# Patient Record
Sex: MALE | Race: WHITE
[De-identification: names, ages, dates, MRNs, and addresses within clinical notes are randomized per-mention and may not be internally consistent; named-entity substitution may affect disease eponyms.]

---

## 2018-11-21 ENCOUNTER — HOSPITAL ENCOUNTER (EMERGENCY)
Dept: HOSPITAL 74 - JER | Age: 68
Discharge: HOME | End: 2018-11-21
Payer: COMMERCIAL

## 2018-11-21 VITALS — SYSTOLIC BLOOD PRESSURE: 135 MMHG | TEMPERATURE: 98.2 F | DIASTOLIC BLOOD PRESSURE: 84 MMHG | HEART RATE: 60 BPM

## 2018-11-21 VITALS — BODY MASS INDEX: 26.6 KG/M2

## 2018-11-21 DIAGNOSIS — Z87.442: ICD-10-CM

## 2018-11-21 DIAGNOSIS — N13.2: Primary | ICD-10-CM

## 2018-11-21 LAB
ALBUMIN SERPL-MCNC: 4 G/DL (ref 3.4–5)
ALP SERPL-CCNC: 92 U/L (ref 45–117)
ALT SERPL-CCNC: 23 U/L (ref 13–61)
ANION GAP SERPL CALC-SCNC: 5 MMOL/L (ref 8–16)
APPEARANCE UR: CLEAR
APTT BLD: 23.1 SECONDS (ref 25.2–36.5)
AST SERPL-CCNC: 16 U/L (ref 15–37)
BASOPHILS # BLD: 0.7 % (ref 0–2)
BILIRUB SERPL-MCNC: 0.4 MG/DL (ref 0.2–1)
BILIRUB UR STRIP.AUTO-MCNC: NEGATIVE MG/DL
BUN SERPL-MCNC: 20 MG/DL (ref 7–18)
CALCIUM SERPL-MCNC: 9.6 MG/DL (ref 8.5–10.1)
CHLORIDE SERPL-SCNC: 110 MMOL/L (ref 98–107)
CO2 SERPL-SCNC: 25 MMOL/L (ref 21–32)
COLOR UR: (no result)
CREAT SERPL-MCNC: 1.2 MG/DL (ref 0.55–1.3)
DEPRECATED RDW RBC AUTO: 13.6 % (ref 11.9–15.9)
EOSINOPHIL # BLD: 0.4 % (ref 0–4.5)
GLUCOSE SERPL-MCNC: 114 MG/DL (ref 74–106)
HCT VFR BLD CALC: 45.3 % (ref 35.4–49)
HGB BLD-MCNC: 14.4 GM/DL (ref 11.7–16.9)
INR BLD: 0.99 (ref 0.83–1.09)
KETONES UR QL STRIP: NEGATIVE
LEUKOCYTE ESTERASE UR QL STRIP.AUTO: NEGATIVE
LYMPHOCYTES # BLD: 14 % (ref 8–40)
MCH RBC QN AUTO: 28.9 PG (ref 25.7–33.7)
MCHC RBC AUTO-ENTMCNC: 31.9 G/DL (ref 32–35.9)
MCV RBC: 90.5 FL (ref 80–96)
MONOCYTES # BLD AUTO: 6.2 % (ref 3.8–10.2)
NEUTROPHILS # BLD: 78.7 % (ref 42.8–82.8)
NITRITE UR QL STRIP: NEGATIVE
PH UR: 7 [PH] (ref 5–8)
PLATELET # BLD AUTO: 234 K/MM3 (ref 134–434)
PMV BLD: 8 FL (ref 7.5–11.1)
POTASSIUM SERPLBLD-SCNC: 4.7 MMOL/L (ref 3.5–5.1)
PROT SERPL-MCNC: 7.6 G/DL (ref 6.4–8.2)
PROT UR QL STRIP: NEGATIVE
PROT UR QL STRIP: NEGATIVE
PT PNL PPP: 11.7 SEC (ref 9.7–13)
RBC # BLD AUTO: 5 M/MM3 (ref 4–5.6)
SODIUM SERPL-SCNC: 140 MMOL/L (ref 136–145)
SP GR UR: 1.01 (ref 1.01–1.03)
UROBILINOGEN UR STRIP-MCNC: NEGATIVE MG/DL (ref 0.2–1)
WBC # BLD AUTO: 7.6 K/MM3 (ref 4–10)

## 2018-11-21 PROCEDURE — 3E033NZ INTRODUCTION OF ANALGESICS, HYPNOTICS, SEDATIVES INTO PERIPHERAL VEIN, PERCUTANEOUS APPROACH: ICD-10-PCS

## 2018-11-21 PROCEDURE — 3E0337Z INTRODUCTION OF ELECTROLYTIC AND WATER BALANCE SUBSTANCE INTO PERIPHERAL VEIN, PERCUTANEOUS APPROACH: ICD-10-PCS

## 2018-11-21 PROCEDURE — 3E0333Z INTRODUCTION OF ANTI-INFLAMMATORY INTO PERIPHERAL VEIN, PERCUTANEOUS APPROACH: ICD-10-PCS

## 2018-11-21 NOTE — PDOC
History of Present Illness





- General


Chief Complaint: Pain


Stated Complaint: BACK PAIN


Time Seen by Provider: 11/21/18 08:44


History Source: Patient, Family





- History of Present Illness


Initial Comments: 





11/21/18 09:07


HPI/ROS/PE performed with family translating. Phone  was offered and 

refused. 





68 year old male with PMH kidney stone (2012) presented to ED for right sided 

flank pain x2 days. He stated his pain is associated with nausea, vomiting. He 

denied abdominal pain, diarrhea, dysuria, fever, chest pain, shortness of 

breath. He stated todays pain feels different than his last kidney stone. Pt 

reported he has been lifting heavy boxes recently. 








Past History





- Past Medical History


Allergies/Adverse Reactions: 


 Allergies











Allergy/AdvReac Type Severity Reaction Status Date / Time


 


No Known Allergies Allergy   Verified 11/21/18 08:18











Home Medications: 


Ambulatory Orders





Ibuprofen 600 mg PO QID #16 tablet 11/21/18 


Oxycodone HCl/Acetaminophen [Percocet 5-325 mg Tablet -] 1 tab PO TID PRN #12 

tablet MDD 3 11/21/18 


Tamsulosin HCl [Flomax] 0.4 mg PO HS #10 capsule 11/21/18 








COPD: No


Kidney Stones: Yes





- Immunization History


Immunization Up to Date: Yes





- Suicide/Smoking/Psychosocial Hx


Smoking History: Never smoked


Hx Alcohol Use: Yes


Drug/Substance Use Hx: No





**Review of Systems





- Review of Systems


Able to Perform ROS?: Yes


Comments:: 





11/21/18 09:09


General: denied fever, chills, night sweats, generalized weakness.


HEENT: denied sore throat, rhinorrhea, ear pain.


Heart: denied chest pain, palpitations, syncope, lower extremity swelling, 

diaphoresis.


Respiratory: denied shortness of breath, cough, sputum production, hemoptysis.


Abdomen: admitted to nausea, vomiting. denied abdominal pain, diarrhea, 

constipation, blood in stool.


: admitted to flank pain. denied dysuria, increased urinary frequency, 

hematuria, urinary incontinence.


Back: denied back pain.


Musculoskeletal: denied joint pain, muscle pain, joint swelling.


Neurological: denied headache, dizziness, numbness, tingling, weakness. 


Skin: denied rash, laceration, abrasion.








*Physical Exam





- Vital Signs


 Last Vital Signs











Temp Pulse Resp BP Pulse Ox


 


 97.8 F   47 L  18   128/68   100 


 


 11/21/18 08:10  11/21/18 08:10  11/21/18 08:10  11/21/18 08:10  11/21/18 08:10














- Physical Exam


Comments: 





11/21/18 09:09


Constitutional: Well-nourished, Well-developed, appearing stated age.


HEENT: head is normocephalic, atraumatic. EOMI. PERRLA. 


Neck: supple. Full ROM.


Heart: regular rhythm. no murmurs, rubs or gallops. 


Lungs: clear to auscultation bilaterally. no crackles, rhonchi or wheezing. no 

stridor.


Back: no CVA tenderness bilaterally. no midline L-spine tenderness. no 

paraspinal L-spine tenderness to palpation. 


Abdomen: soft, nontender. normal bowel sounds. no rebound, guarding, masses. 


Extremities: Peripheral pulses intact and equal. No lower extremity edema.


Neurological: CN 2-12 grossly intact. Moves all four extremities. 


Psych: awake, alert, oriented x3. Follows commands. Answers questions 

appropriately. 











ED Treatment Course





- LABORATORY


CBC & Chemistry Diagram: 


 11/21/18 09:00





 11/21/18 09:00





Medical Decision Making





- Medical Decision Making





11/21/18 09:11


68 year old male with PMH kidney stone presented to ED for right sided flank 

pain associated with nausea/vomiting x2 days. 





 Initial Vital Signs











Temp Pulse Resp BP Pulse Ox


 


 97.8 F   47 L  18   128/68   100 


 


 11/21/18 08:10  11/21/18 08:10  11/21/18 08:10  11/21/18 08:10  11/21/18 08:10








Afebrile. 


Bradycardia, no chest pain, no shortness of breath. 


No tachypnea. 


No hypertension. 


No hypoxia on room air. 





Pending spiral CT for evaluation of possible nephrolithiasis. 


Pending UA/UC, CMP, CBC for evaluation of possible UTI, pyelonephritis, kidney 

function.


Coag and T/S sent for pre-op purposes. 





Tylenol ordered for pain.


 


11/21/18 10:02


 CBC











WBC  7.6 K/mm3 (4.0-10.0)   11/21/18  09:00    


 


RBC  5.00 M/mm3 (4.00-5.60)   11/21/18  09:00    


 


Hgb  14.4 GM/dL (11.7-16.9)   11/21/18  09:00    


 


Hct  45.3 % (35.4-49)   11/21/18  09:00    


 


MCV  90.5 fl (80-96)   11/21/18  09:00    


 


MCH  28.9 pg (25.7-33.7)   11/21/18  09:00    


 


MCHC  31.9 g/dl (32.0-35.9)  L  11/21/18 09:00    


 


RDW  13.6 % (11.9-15.9)   11/21/18  09:00    


 


Plt Count  234 K/MM3 (134-434)   11/21/18  09:00    


 


MPV  8.0 fl (7.5-11.1)   11/21/18 09:00    


 


Absolute Neuts (auto)  6.0 K/mm3 (1.5-8.0)   11/21/18  09:00    


 


Neutrophils %  78.7 % (42.8-82.8)   11/21/18 09:00    


 


Lymphocytes %  14.0 % (8-40)   11/21/18  09:00    


 


Monocytes %  6.2 % (3.8-10.2)   11/21/18  09:00    


 


Eosinophils %  0.4 % (0-4.5)   11/21/18 09:00    


 


Basophils %  0.7 % (0-2.0)   11/21/18  09:00    


 


Nucleated RBC %  0 % (0-0)   11/21/18  09:00    








No leukocytosis. No anemia. 





 Urine Test Results











Urine Color  Ltyellow   11/21/18 09:00    


 


Urine Appearance  Clear   11/21/18 09:00    


 


Urine pH  7.0  (5.0-8.0)   11/21/18 09:00    


 


Ur Specific Gravity  1.013  (1.010-1.035)   11/21/18  09:00    


 


Urine Protein  Negative  (NEGATIVE)   11/21/18 09:00    


 


Urine Glucose (UA)  Negative  (NEGATIVE)   11/21/18  09:00    


 


Urine Ketones  Negative  (NEGATIVE)   11/21/18  09:00    


 


Urine Blood  Negative  (NEGATIVE)   11/21/18  09:00    


 


Urine Nitrite  Negative  (NEGATIVE)   11/21/18 09:00    


 


Urine Bilirubin  Negative  (<2.0 mg/dL)   11/21/18  09:00    


 


Ur Leukocyte Esterase  Negative  (NEGATIVE)   11/21/18  09:00    








No evidence of UTI. No blood in urine. 


11/21/18 10:24


 CMP











Sodium  140 mmol/L (136-145)   11/21/18  09:00    


 


Potassium  4.7 mmol/L (3.5-5.1)   11/21/18  09:00    


 


Chloride  110 mmol/L ()  H  11/21/18  09:00    


 


Carbon Dioxide  25 mmol/L (21-32)   11/21/18  09:00    


 


Anion Gap  5 MMOL/L (8-16)  L  11/21/18  09:00    


 


BUN  20 mg/dL (7-18)  H  11/21/18  09:00    


 


Creatinine  1.2 mg/dL (0.55-1.3)   11/21/18  09:00    


 


Creat Clearance w eGFR  > 60  (>60)   11/21/18  09:00    


 


Random Glucose  114 mg/dL ()  H  11/21/18  09:00    


 


Calcium  9.6 mg/dL (8.5-10.1)   11/21/18  09:00    


 


Total Bilirubin  0.4 mg/dL (0.2-1)   11/21/18  09:00    


 


AST  16 U/L (15-37)   11/21/18  09:00    


 


ALT  23 U/L (13-61)   11/21/18  09:00    


 


Alkaline Phosphatase  92 U/L ()   11/21/18  09:00    


 


Total Protein  7.6 g/dl (6.4-8.2)   11/21/18  09:00    


 


Albumin  4.0 g/dl (3.4-5.0)   11/21/18  09:00    








No electrolyte abnormalities. 


No VICKI. 


Pt is dehydrated, normal saline bolus given. 


No LFT abnormality. 





Pending CT. 





11/21/18 10:54


Spiral CT results: 4-5 mm right ureteral calculus at UVJ with hydronephrosis. 

nonobstructing multiple calculi noted in left kidney, 2-4 mm. incidental 

gastric hypertrophy with upper abdominal lymph nodes noted. left renal cyst and 

liver cyst noted. No AAA. 





Pt reported slight improvement of pain with tylenol. 


Toradol ordered. 


Flomax ordered. 





11/21/18 11:00


Pt notified of results and told there isd a possibility of the findings being 

the result of cancer. Pt informed of need to follow up with GI on these 

findings. Pt stated that that he understood and would follow up. Pt stated that 

he has a PCP but it is difficult to get an appointment. 


Pt given copy of CT report. 


Will give pt PCP follow up. 


Will given pt urology and GI follow up. 





11/21/18 11:19


Pt given PCP appointment with Dr. Villareal 11/26 at 1045 AM. 





*DC/Admit/Observation/Transfer


Diagnosis at time of Disposition: 


 Kidney stone








- Discharge Dispostion


Disposition: HOME


Condition at time of disposition: Stable


Decision to Admit order: No





- Prescriptions


Prescriptions: 


Ibuprofen 600 mg PO QID #16 tablet


Oxycodone HCl/Acetaminophen [Percocet 5-325 mg Tablet -] 1 tab PO TID PRN #12 

tablet MDD 3


 PRN Reason: Severe Pain


Tamsulosin HCl [Flomax] 0.4 mg PO HS #10 capsule





- Referrals


Referrals: 


Tay Jim MD [Staff Physician] - 


Xu Shearer MD [Staff Physician] - 


Doni Dickerson MD [Staff Physician] - 


Alexander Michelle MD., MD [Staff Physician] - 


Binu Laird MD [Staff Physician] - 





- Patient Instructions


Printed Discharge Instructions:  Kidney Stones -- Adult


Additional Instructions: 


You were seen today for flank pain. Your lab work was normal. Your cat-scan 

showed a kidney stone on the right that is causing your pain. There were 

smaller stones also seen on the left. Follow up with the urologist I have 

provided for you on these findings. I have sent pain medication and flomax to 

your pharmacy, take as advised on label. Incidentally, your stomach appeared 

thickened, and there were lymph nodes seen in your upper abdomen. This finding 

is concerning for cancer. Follow up with the gastroenterologist I have provided 

for you. I have provided you with an appointment for a primary care doctor. 

Attend the appointment given to you, Monday 11/26 at 1045 AM with Dr. Judah Fermin. Your care is not complete until you follow up with the primary care doctor

, urologist and gastroenterologist. Return to the Emergency Department for 

increasing pain, vomiting, chest pain, shortness of breath, lightheadedness 

like you may pass out, vomiting blood, or any other new, worsening or 

concerning symptoms. 





Fuiste visto hoy por dolor en el flanco. Tu trabajo de laboratorio fue normal. 

Bocanegra escner de christoph mostr un clculo renal en la derecha que est causando bocanegra 

dolor. Tambin se vean piedras ms pequeas a la izquierda. Oleksandr un 

seguimiento con el urlogo que le proporcion sobre estos hallazgos. He enviado 

medicamentos para el dolor y flomax a bocanegra farmacia, tome ronald se indica en la 

etiqueta. Incidentalmente, bocanegra estmago pareca engrosado y se observaron 

ganglios linfticos en la parte superior del abdomen. Bina hallazgo es 

preocupante para el cncer. Oleksandr un seguimiento con el gastroenterlogo que le 

proporcion. Le he proporcionado ami radha para un mdico de atencin primaria. 

Asista a la radha que le dieron, el lunes 11/26 a las 1045 AM con el Dr. Judah Fermin. Bocanegra atencin no est completa hasta que oleksandr un seguimiento con el mdico 

de atencin primaria, el urlogo y el gastroenterlogo. Regrese al Departamento 

de Emergencias para aumentar el dolor, los vmitos, el dolor en el pecho, la 

falta de aliento, el mareo ronald si pudiera desmayarse, vmitos con lawanda o 

cualquier otro sntoma nuevo, que empeore o relacionado.





- Post Discharge Activity


Forms/Work/School Notes:  Back to Work

## 2018-11-21 NOTE — PDOC
Attending Attestation





- Resident


Resident Name: Yahaira Lerma





- ED Attending Attestation


I have performed the following: I have examined & evaluated the patient, The 

case was reviewed & discussed with the resident, I agree w/resident's findings 

& plan, Exceptions are as noted





- HPI


HPI: 





11/21/18 09:18


Mr Ochoa is a very healthy 67 yo M who presents to the ER with a complaint of 

right flank pain


Pt has a h/o kidney stones 12 years ago (requiring intervention)


Pt states this feels different


Pain is sharp, no radiation, no alleviating factors


Pain began last night


(+) nausea


No hematuria, no dysuria








- Physicial Exam


PE: 





11/21/18 09:53


GENERAL: The patient is in no acute distress.


LUNGS: Breath sounds equal, clear to auscultation bilaterally.  No wheezes, and 

no crackles.


HEART:Regular rate and rhythm, normal S1 and S2 without murmur, rub or gallop.


ABDOMEN: Soft, nontender, normoactive bowel sounds.  No guarding, no rebound. 


EXTREMITIES: Normal range of motion, no edema.  No clubbing or cyanosis. No 

erythema, or tenderness.


NEUROLOGICAL: Cranial nerves II through XII grossly intact.  Normal speech.  No 

focal neurological deficits. 


MUSCULOSKELETAL:  Back non-tender to palpation, Right CVA tenderness


SKIN: Warm, Dry, normal turgor, no rashes or lesions noted.





- Medical Decision Making





11/21/18 09:54


DD includes:


Pyelonephritis, obstructing stone, aortic disaster, musculoskeletal pain





Will do:


Labs


Spiral CT


Tylenol for now


Re assess





5mm UVJ stone


Also, gastric wall thickening


We have discussed with patient and daughter the incredible importance of 

following up with PMD and GI


This is potentially a diagnosis of cancer and therefore must be expeditiously 

worked up


Will plan to discharge to home with treatment for kidney stones

## 2018-11-28 PROBLEM — Z00.00 ENCOUNTER FOR PREVENTIVE HEALTH EXAMINATION: Status: ACTIVE | Noted: 2018-11-28

## 2018-12-06 ENCOUNTER — APPOINTMENT (OUTPATIENT)
Dept: GASTROENTEROLOGY | Facility: CLINIC | Age: 68
End: 2018-12-06

## 2020-08-10 ENCOUNTER — APPOINTMENT (OUTPATIENT)
Dept: INTERNAL MEDICINE | Facility: CLINIC | Age: 70
End: 2020-08-10